# Patient Record
Sex: MALE | Race: WHITE | NOT HISPANIC OR LATINO | Employment: OTHER | URBAN - METROPOLITAN AREA
[De-identification: names, ages, dates, MRNs, and addresses within clinical notes are randomized per-mention and may not be internally consistent; named-entity substitution may affect disease eponyms.]

---

## 2017-01-11 ENCOUNTER — HOSPITAL ENCOUNTER (EMERGENCY)
Facility: MEDICAL CENTER | Age: 37
End: 2017-01-11
Payer: COMMERCIAL

## 2017-01-11 PROCEDURE — 302449 STATCHG TRIAGE ONLY (STATISTIC)

## 2017-01-12 NOTE — ED NOTES
After undressing patient he stated that he felt a though the staff offended him. Staff asked patient what drugs if any that he consumed tonight. Patient was figgidty and unable to articulate his words. Patient is steady on his feet, able to dress himself and ambulate without incident. Patient had his knife returned to him by security.

## 2019-01-04 ENCOUNTER — APPOINTMENT (OUTPATIENT)
Dept: RADIOLOGY | Facility: MEDICAL CENTER | Age: 39
End: 2019-01-04
Attending: EMERGENCY MEDICINE
Payer: COMMERCIAL

## 2019-01-04 ENCOUNTER — HOSPITAL ENCOUNTER (EMERGENCY)
Facility: MEDICAL CENTER | Age: 39
End: 2019-01-04
Attending: EMERGENCY MEDICINE
Payer: COMMERCIAL

## 2019-01-04 VITALS
DIASTOLIC BLOOD PRESSURE: 77 MMHG | WEIGHT: 163.58 LBS | BODY MASS INDEX: 23.42 KG/M2 | TEMPERATURE: 98.1 F | OXYGEN SATURATION: 95 % | HEART RATE: 74 BPM | RESPIRATION RATE: 16 BRPM | SYSTOLIC BLOOD PRESSURE: 117 MMHG | HEIGHT: 70 IN

## 2019-01-04 DIAGNOSIS — S20.212A CONTUSION OF LEFT CHEST WALL, INITIAL ENCOUNTER: ICD-10-CM

## 2019-01-04 DIAGNOSIS — R07.89 CHEST WALL PAIN: ICD-10-CM

## 2019-01-04 LAB
ALBUMIN SERPL BCP-MCNC: 4.6 G/DL (ref 3.2–4.9)
ALBUMIN/GLOB SERPL: 2 G/DL
ALP SERPL-CCNC: 62 U/L (ref 30–99)
ALT SERPL-CCNC: 13 U/L (ref 2–50)
ANION GAP SERPL CALC-SCNC: 9 MMOL/L (ref 0–11.9)
APTT PPP: 26.8 SEC (ref 24.7–36)
AST SERPL-CCNC: 13 U/L (ref 12–45)
BASOPHILS # BLD AUTO: 0.9 % (ref 0–1.8)
BASOPHILS # BLD: 0.07 K/UL (ref 0–0.12)
BILIRUB SERPL-MCNC: 1.9 MG/DL (ref 0.1–1.5)
BUN SERPL-MCNC: 24 MG/DL (ref 8–22)
CALCIUM SERPL-MCNC: 9.7 MG/DL (ref 8.5–10.5)
CHLORIDE SERPL-SCNC: 104 MMOL/L (ref 96–112)
CO2 SERPL-SCNC: 25 MMOL/L (ref 20–33)
CREAT SERPL-MCNC: 1.09 MG/DL (ref 0.5–1.4)
D DIMER PPP IA.FEU-MCNC: <0.4 UG/ML (FEU) (ref 0–0.5)
EOSINOPHIL # BLD AUTO: 0.09 K/UL (ref 0–0.51)
EOSINOPHIL NFR BLD: 1.1 % (ref 0–6.9)
ERYTHROCYTE [DISTWIDTH] IN BLOOD BY AUTOMATED COUNT: 43.4 FL (ref 35.9–50)
GLOBULIN SER CALC-MCNC: 2.3 G/DL (ref 1.9–3.5)
GLUCOSE SERPL-MCNC: 110 MG/DL (ref 65–99)
HCT VFR BLD AUTO: 47.3 % (ref 42–52)
HGB BLD-MCNC: 16.5 G/DL (ref 14–18)
IMM GRANULOCYTES # BLD AUTO: 0.02 K/UL (ref 0–0.11)
IMM GRANULOCYTES NFR BLD AUTO: 0.3 % (ref 0–0.9)
INR PPP: 1.1 (ref 0.87–1.13)
LIPASE SERPL-CCNC: 23 U/L (ref 11–82)
LYMPHOCYTES # BLD AUTO: 2.24 K/UL (ref 1–4.8)
LYMPHOCYTES NFR BLD: 28 % (ref 22–41)
MCH RBC QN AUTO: 32.4 PG (ref 27–33)
MCHC RBC AUTO-ENTMCNC: 34.9 G/DL (ref 33.7–35.3)
MCV RBC AUTO: 92.7 FL (ref 81.4–97.8)
MONOCYTES # BLD AUTO: 1.02 K/UL (ref 0–0.85)
MONOCYTES NFR BLD AUTO: 12.8 % (ref 0–13.4)
NEUTROPHILS # BLD AUTO: 4.56 K/UL (ref 1.82–7.42)
NEUTROPHILS NFR BLD: 56.9 % (ref 44–72)
NRBC # BLD AUTO: 0 K/UL
NRBC BLD-RTO: 0 /100 WBC
PLATELET # BLD AUTO: 248 K/UL (ref 164–446)
PMV BLD AUTO: 10 FL (ref 9–12.9)
POTASSIUM SERPL-SCNC: 3.7 MMOL/L (ref 3.6–5.5)
PROT SERPL-MCNC: 6.9 G/DL (ref 6–8.2)
PROTHROMBIN TIME: 14.3 SEC (ref 12–14.6)
RBC # BLD AUTO: 5.1 M/UL (ref 4.7–6.1)
SODIUM SERPL-SCNC: 138 MMOL/L (ref 135–145)
TROPONIN I SERPL-MCNC: 0.01 NG/ML (ref 0–0.04)
WBC # BLD AUTO: 8 K/UL (ref 4.8–10.8)

## 2019-01-04 PROCEDURE — 99285 EMERGENCY DEPT VISIT HI MDM: CPT

## 2019-01-04 PROCEDURE — 700111 HCHG RX REV CODE 636 W/ 250 OVERRIDE (IP): Performed by: EMERGENCY MEDICINE

## 2019-01-04 PROCEDURE — 96374 THER/PROPH/DIAG INJ IV PUSH: CPT

## 2019-01-04 PROCEDURE — 700102 HCHG RX REV CODE 250 W/ 637 OVERRIDE(OP): Performed by: EMERGENCY MEDICINE

## 2019-01-04 PROCEDURE — 85730 THROMBOPLASTIN TIME PARTIAL: CPT

## 2019-01-04 PROCEDURE — 700117 HCHG RX CONTRAST REV CODE 255: Performed by: EMERGENCY MEDICINE

## 2019-01-04 PROCEDURE — 85379 FIBRIN DEGRADATION QUANT: CPT

## 2019-01-04 PROCEDURE — 84484 ASSAY OF TROPONIN QUANT: CPT

## 2019-01-04 PROCEDURE — 83690 ASSAY OF LIPASE: CPT

## 2019-01-04 PROCEDURE — 80053 COMPREHEN METABOLIC PANEL: CPT

## 2019-01-04 PROCEDURE — 36415 COLL VENOUS BLD VENIPUNCTURE: CPT

## 2019-01-04 PROCEDURE — 71260 CT THORAX DX C+: CPT

## 2019-01-04 PROCEDURE — 85610 PROTHROMBIN TIME: CPT

## 2019-01-04 PROCEDURE — 96375 TX/PRO/DX INJ NEW DRUG ADDON: CPT

## 2019-01-04 PROCEDURE — 85025 COMPLETE CBC W/AUTO DIFF WBC: CPT

## 2019-01-04 PROCEDURE — A9270 NON-COVERED ITEM OR SERVICE: HCPCS | Performed by: EMERGENCY MEDICINE

## 2019-01-04 PROCEDURE — 93005 ELECTROCARDIOGRAM TRACING: CPT | Performed by: EMERGENCY MEDICINE

## 2019-01-04 RX ORDER — CELECOXIB 100 MG/1
100 CAPSULE ORAL 2 TIMES DAILY
Qty: 20 CAP | Refills: 0 | Status: SHIPPED | OUTPATIENT
Start: 2019-01-04 | End: 2023-10-02

## 2019-01-04 RX ORDER — MORPHINE SULFATE 4 MG/ML
4 INJECTION, SOLUTION INTRAMUSCULAR; INTRAVENOUS ONCE
Status: COMPLETED | OUTPATIENT
Start: 2019-01-04 | End: 2019-01-04

## 2019-01-04 RX ORDER — OXYCODONE HYDROCHLORIDE AND ACETAMINOPHEN 5; 325 MG/1; MG/1
1 TABLET ORAL ONCE
Status: COMPLETED | OUTPATIENT
Start: 2019-01-04 | End: 2019-01-04

## 2019-01-04 RX ORDER — ONDANSETRON 4 MG/1
4 TABLET, ORALLY DISINTEGRATING ORAL ONCE
Status: COMPLETED | OUTPATIENT
Start: 2019-01-04 | End: 2019-01-04

## 2019-01-04 RX ORDER — ONDANSETRON 2 MG/ML
4 INJECTION INTRAMUSCULAR; INTRAVENOUS ONCE
Status: COMPLETED | OUTPATIENT
Start: 2019-01-04 | End: 2019-01-04

## 2019-01-04 RX ADMIN — OXYCODONE AND ACETAMINOPHEN 1 TABLET: 5; 325 TABLET ORAL at 17:01

## 2019-01-04 RX ADMIN — ONDANSETRON 4 MG: 2 INJECTION INTRAMUSCULAR; INTRAVENOUS at 14:51

## 2019-01-04 RX ADMIN — ONDANSETRON 4 MG: 4 TABLET, ORALLY DISINTEGRATING ORAL at 17:01

## 2019-01-04 RX ADMIN — IOHEXOL 100 ML: 350 INJECTION, SOLUTION INTRAVENOUS at 15:53

## 2019-01-04 RX ADMIN — MORPHINE SULFATE 4 MG: 4 INJECTION INTRAVENOUS at 14:52

## 2019-01-04 ASSESSMENT — PAIN SCALES - GENERAL
PAINLEVEL_OUTOF10: 9
PAINLEVEL_OUTOF10: 3

## 2019-01-04 NOTE — ED PROVIDER NOTES
"ED Provider Note  CHIEF COMPLAINT  Chief Complaint   Patient presents with   • Rib Pain     pt reports to have slipped while pushing vehicle and striking ribs on vehicles mirror. pt states increased SOB since. pain in left shoulder that comes and goes in intensity.    • Shortness of Breath       HPI  Ady Bowser is a 38 y.o. male who presents complaining of pain to his left lower posterior and anterior ribs.  Feels little short of breath.  He was pushing his car on [place] approximately 11 or 12 days ago.  And injured his left ribs.  He feels he has increasing pain and increasing difficulty breathing.  He is been eating well taking in fluids well.  May be a minimal amount of left upper quadrant abdominal pain.    REVIEW OF SYSTEMS  No headache, no neck pain, no pelvic pain or extremity injury.  ALL OTHER SYSTEMS NEGATIVE    ALLERGIES  No known drug allergies  PAST MEDICAL HISTORY  Past Medical History:   Diagnosis Date   • Asthma    • Bipolar 1 disorder (HCC)    • H/O suicide attempt    • Renal failure     secondary to flexeril overdose years ago     FAMILY HISTORY  Negative    SOCIAL HISTORY  Cigarette smoker    PHYSICAL EXAM  GENERAL: *Alert male adult  VITAL SIGNS: /79   Pulse 83   Temp 36.4 °C (97.6 °F) (Temporal)   Resp 16   Ht 1.778 m (5' 10\")   Wt 74.2 kg (163 lb 9.3 oz)   SpO2 97%   BMI 23.47 kg/m²    Constitutional: Alert male adult HENT: Scalp is normal size and nontender. Ears are clear. Nose is clear. Throat is clear with no stridor no drooling no trismus. Teeth are all intact.  Eyes: Pupils equal round and reactive to light, extraocular motor fall. There is no scleral icterus.  Neck: Neck is supple and nontender. There is no meningismus. No adenitis. No thyromegaly.  Cardiovascular: Heart regular rhythm without murmurs or gallops   Thorax & Lungs: Left anterior lower and left posterior lower chest wall tenderness. Lungs are clear. Patient has good breath sounds bilateral. No " rales, no rhonchi, no wheezes.  Abdomen: Abdomen is soft, minimal left upper quadrant tenderness not rigid, no guarding, and no organomegaly. There is no palpable hernia   Skin: Warm, pink, and dry with no erythema and no rash.   Back: Nontender, no midline bony tenderness, no flank tenderness.  Extremities: Full range of motion  No tenderness to palpation and no deformities noted. No calf or thigh swelling. No calf or thigh tenderness. No clinical DVT.  Neurologic: Alert & oriented . Cranial nerves are grossly intact as tested. Patient moves all 4 extremities well. Patient has good strong flexion and extension of the ankle joints knee joints hip joints and elbow joints. Sensation is normal and symmetrical in the upper and lower extremities.   Psychiatric: Patient is alert oriented coherent and rational.     RADIOLOGY/PROCEDURES  CT-CHEST,ABDOMEN,PELVIS WITH   Final Result      No acute or significant abnormality within the chest, abdomen, or pelvis        COURSE & MEDICAL DECISION MAKING  11 days post injury to his left lower rib area.  Has a minimal amount of left upper quadrant abdominal pain and difficulty breathing.  No fever chills or cough.  Differential diagnosis: Rib fracture, pneumothorax, hemothorax, spleen injury, pneumonia, etc.    Plan: #1 IV #2 CT of the chest abdomen and pelvis #3 lab evaluation including CBC, CMP, pro time, urinalysis.  4.  Intravenous morphine and Zofran.    EKG interpreted by me shows what appears to be J-point elevation in lead III and aVF.  T wave inversion in aVL.  However the patient is a cigarette smoker with a positive family history.  I have recommended that he be admitted for stress test.    Laboratory and reexamination course: CBC is normal no anemia no bandemia.  Hemoglobin is 16.  Chemistry panel is normal with no renal nor liver dysfunction.  Lipase is normal no pancreatitis.  CT chest abdomen and pelvis shows no intra-abdominal findings no pneumonia.  No pneumothorax  no hemothorax.  Patient stable for discharge.    Results for orders placed or performed during the hospital encounter of 01/04/19   CBC WITH DIFFERENTIAL   Result Value Ref Range    WBC 8.0 4.8 - 10.8 K/uL    RBC 5.10 4.70 - 6.10 M/uL    Hemoglobin 16.5 14.0 - 18.0 g/dL    Hematocrit 47.3 42.0 - 52.0 %    MCV 92.7 81.4 - 97.8 fL    MCH 32.4 27.0 - 33.0 pg    MCHC 34.9 33.7 - 35.3 g/dL    RDW 43.4 35.9 - 50.0 fL    Platelet Count 248 164 - 446 K/uL    MPV 10.0 9.0 - 12.9 fL    Neutrophils-Polys 56.90 44.00 - 72.00 %    Lymphocytes 28.00 22.00 - 41.00 %    Monocytes 12.80 0.00 - 13.40 %    Eosinophils 1.10 0.00 - 6.90 %    Basophils 0.90 0.00 - 1.80 %    Immature Granulocytes 0.30 0.00 - 0.90 %    Nucleated RBC 0.00 /100 WBC    Neutrophils (Absolute) 4.56 1.82 - 7.42 K/uL    Lymphs (Absolute) 2.24 1.00 - 4.80 K/uL    Monos (Absolute) 1.02 (H) 0.00 - 0.85 K/uL    Eos (Absolute) 0.09 0.00 - 0.51 K/uL    Baso (Absolute) 0.07 0.00 - 0.12 K/uL    Immature Granulocytes (abs) 0.02 0.00 - 0.11 K/uL    NRBC (Absolute) 0.00 K/uL   COMP METABOLIC PANEL   Result Value Ref Range    Sodium 138 135 - 145 mmol/L    Potassium 3.7 3.6 - 5.5 mmol/L    Chloride 104 96 - 112 mmol/L    Co2 25 20 - 33 mmol/L    Anion Gap 9.0 0.0 - 11.9    Glucose 110 (H) 65 - 99 mg/dL    Bun 24 (H) 8 - 22 mg/dL    Creatinine 1.09 0.50 - 1.40 mg/dL    Calcium 9.7 8.5 - 10.5 mg/dL    AST(SGOT) 13 12 - 45 U/L    ALT(SGPT) 13 2 - 50 U/L    Alkaline Phosphatase 62 30 - 99 U/L    Total Bilirubin 1.9 (H) 0.1 - 1.5 mg/dL    Albumin 4.6 3.2 - 4.9 g/dL    Total Protein 6.9 6.0 - 8.2 g/dL    Globulin 2.3 1.9 - 3.5 g/dL    A-G Ratio 2.0 g/dL   LIPASE   Result Value Ref Range    Lipase 23 11 - 82 U/L   PROTHROMBIN TIME   Result Value Ref Range    PT 14.3 12.0 - 14.6 sec    INR 1.10 0.87 - 1.13   APTT   Result Value Ref Range    APTT 26.8 24.7 - 36.0 sec   ESTIMATED GFR   Result Value Ref Range    GFR If African American >60 >60 mL/min/1.73 m 2    GFR If Non African  American >60 >60 mL/min/1.73 m 2      Patient has a left chest wall contusion.  His evaluation here is normal.  No pneumothorax no hemothorax no spleen injury etc. she clearly has a left chest wall contusion.  Troponin and a d-dimer and an EKG will be obtained also.  Mother had a heart attack in her 30s.  No arrhythmias here.  Admission for a stress test and cardiac evaluation recommended.    At 5 PM the patient along with his female significant other have decided that he should sign out AGAINST MEDICAL ADVICE.  He understands the risks associated with leaving against advice.  The risks of being heart attack pulmonary embolism brain damage death respiratory arrest etc.  He understands those risks..  He will go to the emergency room in [place] where he lives.    Home treatment: #1 he has  been given a copy of all of his reports #2 is been told to go directly to the [facility] where he lives for reevaluation.  #3 has been told to return to the ED here anytime as needed.    .  FINAL IMPRESSION  1 left chest wall injury contusion.  2.  Cigarette smoker with family history for heart disease  3.  The patient is signed out AGAINST MEDICAL ADVICE.  Electronically signed by: Gary Gansert, 1/4/2019/4:30 PM

## 2019-01-04 NOTE — ED TRIAGE NOTES
Chief Complaint   Patient presents with   • Rib Pain     pt reports to have slipped while pushing vehicle and striking ribs on vehicles mirror. pt states increased SOB since. pain in left shoulder that comes and goes in intensity.    • Shortness of Breath       Explained to pt triage process, made pt aware to tell this RN/staff of any changes/concerns, pt verbalized understanding of process and instructions given. Pt to ER lobby.

## 2019-01-05 NOTE — ED NOTES
Pt left AMA, instruction and prescription given. Pt will f/u with pcp for further eval outpatient as discussed by Dr.GAnsert . Wife will be driving home, vss ambulating steady

## 2019-01-05 NOTE — ED NOTES
Pt pulled his iv out , checked tip of catheter , intact.Bleeding stopped and bruising noted.Pt wants to leave now, informed erp.   Dr.GAnsert at bedside

## 2019-01-20 LAB — EKG IMPRESSION: NORMAL

## 2023-10-02 ENCOUNTER — APPOINTMENT (OUTPATIENT)
Dept: RADIOLOGY | Facility: MEDICAL CENTER | Age: 43
End: 2023-10-02
Attending: EMERGENCY MEDICINE
Payer: COMMERCIAL

## 2023-10-02 ENCOUNTER — HOSPITAL ENCOUNTER (EMERGENCY)
Facility: MEDICAL CENTER | Age: 43
End: 2023-10-02
Attending: EMERGENCY MEDICINE
Payer: COMMERCIAL

## 2023-10-02 VITALS
BODY MASS INDEX: 26.05 KG/M2 | WEIGHT: 182 LBS | HEIGHT: 70 IN | SYSTOLIC BLOOD PRESSURE: 130 MMHG | DIASTOLIC BLOOD PRESSURE: 83 MMHG | HEART RATE: 94 BPM | TEMPERATURE: 97.7 F | RESPIRATION RATE: 22 BRPM | OXYGEN SATURATION: 93 %

## 2023-10-02 DIAGNOSIS — S16.1XXA STRAIN OF NECK MUSCLE, INITIAL ENCOUNTER: ICD-10-CM

## 2023-10-02 DIAGNOSIS — S09.90XA CLOSED HEAD INJURY, INITIAL ENCOUNTER: ICD-10-CM

## 2023-10-02 DIAGNOSIS — S40.011A CONTUSION OF RIGHT SHOULDER, INITIAL ENCOUNTER: ICD-10-CM

## 2023-10-02 LAB
ALBUMIN SERPL BCP-MCNC: 4.8 G/DL (ref 3.2–4.9)
ALBUMIN/GLOB SERPL: 1.8 G/DL
ALP SERPL-CCNC: 93 U/L (ref 30–99)
ALT SERPL-CCNC: 21 U/L (ref 2–50)
ANION GAP SERPL CALC-SCNC: 14 MMOL/L (ref 7–16)
APTT PPP: 25.3 SEC (ref 24.7–36)
AST SERPL-CCNC: 15 U/L (ref 12–45)
BASOPHILS # BLD AUTO: 0.7 % (ref 0–1.8)
BASOPHILS # BLD: 0.06 K/UL (ref 0–0.12)
BILIRUB SERPL-MCNC: 0.6 MG/DL (ref 0.1–1.5)
BUN SERPL-MCNC: 16 MG/DL (ref 8–22)
CALCIUM ALBUM COR SERPL-MCNC: 9.1 MG/DL (ref 8.5–10.5)
CALCIUM SERPL-MCNC: 9.7 MG/DL (ref 8.5–10.5)
CHLORIDE SERPL-SCNC: 106 MMOL/L (ref 96–112)
CO2 SERPL-SCNC: 19 MMOL/L (ref 20–33)
CREAT SERPL-MCNC: 0.67 MG/DL (ref 0.5–1.4)
EOSINOPHIL # BLD AUTO: 0.07 K/UL (ref 0–0.51)
EOSINOPHIL NFR BLD: 0.9 % (ref 0–6.9)
ERYTHROCYTE [DISTWIDTH] IN BLOOD BY AUTOMATED COUNT: 41.4 FL (ref 35.9–50)
GFR SERPLBLD CREATININE-BSD FMLA CKD-EPI: 119 ML/MIN/1.73 M 2
GLOBULIN SER CALC-MCNC: 2.7 G/DL (ref 1.9–3.5)
GLUCOSE SERPL-MCNC: 102 MG/DL (ref 65–99)
HCT VFR BLD AUTO: 46.9 % (ref 42–52)
HGB BLD-MCNC: 16.1 G/DL (ref 14–18)
IMM GRANULOCYTES # BLD AUTO: 0.03 K/UL (ref 0–0.11)
IMM GRANULOCYTES NFR BLD AUTO: 0.4 % (ref 0–0.9)
INR PPP: 1.08 (ref 0.87–1.13)
LYMPHOCYTES # BLD AUTO: 1.91 K/UL (ref 1–4.8)
LYMPHOCYTES NFR BLD: 23.7 % (ref 22–41)
MCH RBC QN AUTO: 30.3 PG (ref 27–33)
MCHC RBC AUTO-ENTMCNC: 34.3 G/DL (ref 32.3–36.5)
MCV RBC AUTO: 88.2 FL (ref 81.4–97.8)
MONOCYTES # BLD AUTO: 0.74 K/UL (ref 0–0.85)
MONOCYTES NFR BLD AUTO: 9.2 % (ref 0–13.4)
NEUTROPHILS # BLD AUTO: 5.25 K/UL (ref 1.82–7.42)
NEUTROPHILS NFR BLD: 65.1 % (ref 44–72)
NRBC # BLD AUTO: 0 K/UL
NRBC BLD-RTO: 0 /100 WBC (ref 0–0.2)
PLATELET # BLD AUTO: 292 K/UL (ref 164–446)
PMV BLD AUTO: 10 FL (ref 9–12.9)
POTASSIUM SERPL-SCNC: 4.4 MMOL/L (ref 3.6–5.5)
PROT SERPL-MCNC: 7.5 G/DL (ref 6–8.2)
PROTHROMBIN TIME: 14.1 SEC (ref 12–14.6)
RBC # BLD AUTO: 5.32 M/UL (ref 4.7–6.1)
SODIUM SERPL-SCNC: 139 MMOL/L (ref 135–145)
WBC # BLD AUTO: 8.1 K/UL (ref 4.8–10.8)

## 2023-10-02 PROCEDURE — 85610 PROTHROMBIN TIME: CPT

## 2023-10-02 PROCEDURE — 71045 X-RAY EXAM CHEST 1 VIEW: CPT

## 2023-10-02 PROCEDURE — 70450 CT HEAD/BRAIN W/O DYE: CPT

## 2023-10-02 PROCEDURE — 80053 COMPREHEN METABOLIC PANEL: CPT

## 2023-10-02 PROCEDURE — 99284 EMERGENCY DEPT VISIT MOD MDM: CPT

## 2023-10-02 PROCEDURE — 700102 HCHG RX REV CODE 250 W/ 637 OVERRIDE(OP): Performed by: EMERGENCY MEDICINE

## 2023-10-02 PROCEDURE — 73030 X-RAY EXAM OF SHOULDER: CPT | Mod: RT

## 2023-10-02 PROCEDURE — 85730 THROMBOPLASTIN TIME PARTIAL: CPT

## 2023-10-02 PROCEDURE — 73000 X-RAY EXAM OF COLLAR BONE: CPT | Mod: RT

## 2023-10-02 PROCEDURE — 36415 COLL VENOUS BLD VENIPUNCTURE: CPT

## 2023-10-02 PROCEDURE — 72125 CT NECK SPINE W/O DYE: CPT

## 2023-10-02 PROCEDURE — A9270 NON-COVERED ITEM OR SERVICE: HCPCS | Performed by: EMERGENCY MEDICINE

## 2023-10-02 PROCEDURE — 85025 COMPLETE CBC W/AUTO DIFF WBC: CPT

## 2023-10-02 RX ORDER — HYDROCODONE BITARTRATE AND ACETAMINOPHEN 5; 325 MG/1; MG/1
2 TABLET ORAL ONCE
Status: COMPLETED | OUTPATIENT
Start: 2023-10-02 | End: 2023-10-02

## 2023-10-02 RX ADMIN — HYDROCODONE BITARTRATE AND ACETAMINOPHEN 2 TABLET: 5; 325 TABLET ORAL at 15:18

## 2023-10-02 ASSESSMENT — LIFESTYLE VARIABLES: DO YOU DRINK ALCOHOL: NO

## 2023-10-02 NOTE — ED PROVIDER NOTES
"ED Provider Note    CHIEF COMPLAINT  Chief Complaint   Patient presents with    T-5000    Fall Greater than 10 feet    Shoulder Pain    Neck Pain    Numbness       EXTERNAL RECORDS REVIEWED  Reviewed evaluation at [facility] urgent care [facility]    HPI/ROS  LIMITATION TO HISTORY   None  OUTSIDE HISTORIAN(S):  None    Ady Bowser is a 43 y.o. male who presents for evaluation of trauma.  The patient [place] 4 [place].  He injured his head and neck and right shoulder.  He has had some transient paresthesias to the right aspect of his face.  Patient is not on any anticoagulants.  He apparently was laying on the couch all weekend and came in to an urgent care in [facility] today for evaluation.  They felt his mechanism merited higher level of care and referred him here to our local trauma center.  He denies any focal numbness weakness tingling to the arms legs or face currently.  He reports no pain or trauma to the chest abdomen pelvis.  He reports headache, neck pain and right shoulder and clavicle pain.  No other complaints    PAST MEDICAL HISTORY   has a past medical history of Asthma, Bipolar 1 disorder (HCC), H/O suicide attempt, and Renal failure.    SURGICAL HISTORY   has a past surgical history that includes appendectomy and hernia repair.    FAMILY HISTORY  History reviewed. No pertinent family history.    SOCIAL HISTORY  Social History     Tobacco Use    Smoking status: Former     Current packs/day: 0.50     Types: Cigarettes    Smokeless tobacco: Current   Vaping Use    Vaping Use: Not on file   Substance and Sexual Activity    Alcohol use: Not Currently     Comment: socially    Drug use: Not Currently     Comment: denies    Sexual activity: Not on file       CURRENT MEDICATIONS  Home Medications       Reviewed by Mini Goodwin R.N. (Registered Nurse) on 10/02/23 at 1439  Med List Status: Complete     Medication Last " "Dose Status   albuterol (VENTOLIN OR PROVENTIL) 108 (90 BASE) MCG/ACT Aero Soln  Active                    ALLERGIES  Allergies   Allergen Reactions    Bee     Toradol [Ketorolac Tromethamine]      Headaches, redness in arm of injection       PHYSICAL EXAM  VITAL SIGNS: /83   Pulse 94   Temp 36.7 °C (98 °F) (Temporal)   Resp (!) 22   Ht 1.778 m (5' 10\")   Wt 82.6 kg (182 lb)   SpO2 93%   BMI 26.11 kg/m²    Pulse ox interpretation: I interpret this pulse ox as normal.  Constitutional: Alert and oriented x 3, no acute distress  HEENT: Atraumatic normocephalic, pupils are equal round reactive to light extraocular movements are intact. The nares is clear, external ears are normal, mouth shows moist mucous membranes normal dentition for age  Neck: Rigid cervical collar is in place bony tenderness at C5-6 without step-off   cardiovascular: Regular rate and rhythm no murmur rub or gallop 2+ pulses peripherally x4  Thorax & Lungs: No respiratory distress, no wheezes rales or rhonchi, No chest tenderness.   GI: Soft nontender nondistended positive bowel sounds, no peritoneal signs  Skin: Warm dry no acute rash or lesion  Musculoskeletal: Tenderness noted at the acromioclavicular joint on the right side without step-off.  Tenderness over the distal third of the right clavicle without tenting of the skin.    Neurologic: Cranial nerves III through XII are grossly intact no sensory deficit no cerebellar dysfunction   Psychiatric: Appropriate affect for situation at this time          DIAGNOSTIC STUDIES / PROCEDURES    LABS  Results for orders placed or performed during the hospital encounter of 10/02/23   CBC WITH DIFFERENTIAL   Result Value Ref Range    WBC 8.1 4.8 - 10.8 K/uL    RBC 5.32 4.70 - 6.10 M/uL    Hemoglobin 16.1 14.0 - 18.0 g/dL    Hematocrit 46.9 42.0 - 52.0 %    MCV 88.2 81.4 - 97.8 fL    MCH 30.3 27.0 - 33.0 pg    MCHC 34.3 32.3 - 36.5 g/dL    RDW 41.4 35.9 - 50.0 fL    Platelet Count 292 164 - 446 " K/uL    MPV 10.0 9.0 - 12.9 fL    Neutrophils-Polys 65.10 44.00 - 72.00 %    Lymphocytes 23.70 22.00 - 41.00 %    Monocytes 9.20 0.00 - 13.40 %    Eosinophils 0.90 0.00 - 6.90 %    Basophils 0.70 0.00 - 1.80 %    Immature Granulocytes 0.40 0.00 - 0.90 %    Nucleated RBC 0.00 0.00 - 0.20 /100 WBC    Neutrophils (Absolute) 5.25 1.82 - 7.42 K/uL    Lymphs (Absolute) 1.91 1.00 - 4.80 K/uL    Monos (Absolute) 0.74 0.00 - 0.85 K/uL    Eos (Absolute) 0.07 0.00 - 0.51 K/uL    Baso (Absolute) 0.06 0.00 - 0.12 K/uL    Immature Granulocytes (abs) 0.03 0.00 - 0.11 K/uL    NRBC (Absolute) 0.00 K/uL   Comp Metabolic Panel   Result Value Ref Range    Sodium 139 135 - 145 mmol/L    Potassium 4.4 3.6 - 5.5 mmol/L    Chloride 106 96 - 112 mmol/L    Co2 19 (L) 20 - 33 mmol/L    Anion Gap 14.0 7.0 - 16.0    Glucose 102 (H) 65 - 99 mg/dL    Bun 16 8 - 22 mg/dL    Creatinine 0.67 0.50 - 1.40 mg/dL    Calcium 9.7 8.5 - 10.5 mg/dL    Correct Calcium 9.1 8.5 - 10.5 mg/dL    AST(SGOT) 15 12 - 45 U/L    ALT(SGPT) 21 2 - 50 U/L    Alkaline Phosphatase 93 30 - 99 U/L    Total Bilirubin 0.6 0.1 - 1.5 mg/dL    Albumin 4.8 3.2 - 4.9 g/dL    Total Protein 7.5 6.0 - 8.2 g/dL    Globulin 2.7 1.9 - 3.5 g/dL    A-G Ratio 1.8 g/dL   Prothrombin Time   Result Value Ref Range    PT 14.1 12.0 - 14.6 sec    INR 1.08 0.87 - 1.13   APTT   Result Value Ref Range    APTT 25.3 24.7 - 36.0 sec   ESTIMATED GFR   Result Value Ref Range    GFR (CKD-EPI) 119 >60 mL/min/1.73 m 2        RADIOLOGY  I have independently interpreted the diagnostic imaging associated with this visit and am waiting the final reading from the radiologist.   My preliminary interpretation is as follows: No acute skull fracture or intracranial hemorrhage no obvious rib fractures possible remote old clavicle fracture  Radiologist interpretation:   CT-CSPINE WITHOUT PLUS RECONS   Final Result      1.  No acute traumatic injury of the cervical spine.   2.  Disc degenerative changes at C5-C6 and  C6-7.   3.  Facet joint degenerative changes.      CT-HEAD W/O   Final Result      Head CT without contrast within normal limits. No evidence of acute cerebral infarction, hemorrhage or mass lesion.         DX-SHOULDER 2+ RIGHT   Final Result      1.  Normal shoulder series.      DX-CLAVICLE RIGHT   Final Result      Remote appearing mid right clavicular fracture with possible nondisplaced acute on chronic fracture. Correlate with point tenderness for acute injury.      DX-CHEST-PORTABLE (1 VIEW)   Final Result      1.  No acute cardiac or pulmonary abnormalities are identified.          COURSE & MEDICAL DECISION MAKING    ED Observation Status? No; Patient does not meet criteria for ED Observation.     INITIAL ASSESSMENT, COURSE AND PLAN  Care Narrative:   This is a very pleasant 43-year-old gentleman who had major mechanism trauma 4 days ago.  He was seen at a local urgent care up in [facility] referred here for higher level care.  His vital signs are reassuring and normal.  Specifically no hypotension no hypoxia fever or tachycardia.  Chest x-ray and right shoulder x-ray are unremarkable.  He does report a history of a old clavicle fracture that is not likely what appears to be an acute new clavicle fracture.  Subsequent CT scan of the head and cervical spine do not demonstrate any acute process.  He was given some oral Norco.  Laboratory studies including CBC and metabolic panel are reassuring and normal      ADDITIONAL PROBLEM LIST    DISPOSITION AND DISCUSSIONS  I have discussed management of the patient with the following physicians and BERENICE's: None    Discussion of management with other QHP or appropriate source(s): None    Escalation of care considered, and ultimately not performed:IV fluids    Barriers to care at this time, including but not limited to: Patient does not have established PCP.     Decision tools and prescription drugs considered including, but not limited to: Considered opioids but  recommended NSAIDs    FINAL DIAGNOSIS  1. Strain of neck muscle, initial encounter        2. Closed head injury, initial encounter        3. Contusion of right shoulder, initial encounter                 Electronically signed by: Dameon Do M.D., 10/2/2023 3:36 PM

## 2023-10-02 NOTE — ED TRIAGE NOTES
Patient arrives by EMS from home for a fall down an embankment 4 days ago. He reprots he slipped and fell down a hill. +loc unknown amount of time and then was able to walk out form the area. Has right shoulder pain, right side neck pain, blurred vision. Right arm, right leg and intermittent right face tingling. Arrives in a c-collar and sling.

## 2023-10-03 NOTE — ED NOTES
Pt has been provided written and verbal education on rotator cup injury and head injury. Home care and when to return to the ED discussed.

## 2023-10-03 NOTE — DISCHARGE PLANNING
KATI contacted to assist with transportation; KATI scheduled uber ride, approved via leader on call.  KATI met with patient to confirm address and informed him of uber ride being programmed.  Pt left via uber.

## 2025-04-10 ENCOUNTER — APPOINTMENT (OUTPATIENT)
Dept: RADIOLOGY | Facility: MEDICAL CENTER | Age: 45
End: 2025-04-10
Payer: COMMERCIAL

## 2025-04-10 ENCOUNTER — HOSPITAL ENCOUNTER (EMERGENCY)
Facility: MEDICAL CENTER | Age: 45
End: 2025-04-10
Payer: COMMERCIAL

## 2025-04-10 VITALS
TEMPERATURE: 98.1 F | WEIGHT: 173.5 LBS | SYSTOLIC BLOOD PRESSURE: 124 MMHG | BODY MASS INDEX: 24.89 KG/M2 | HEART RATE: 74 BPM | RESPIRATION RATE: 18 BRPM | DIASTOLIC BLOOD PRESSURE: 73 MMHG | OXYGEN SATURATION: 96 %

## 2025-04-10 LAB
ALBUMIN SERPL BCP-MCNC: 3.8 G/DL (ref 3.2–4.9)
ALBUMIN/GLOB SERPL: 1.6 G/DL
ALP SERPL-CCNC: 77 U/L (ref 30–99)
ALT SERPL-CCNC: 14 U/L (ref 2–50)
ANION GAP SERPL CALC-SCNC: 10 MMOL/L (ref 7–16)
AST SERPL-CCNC: 17 U/L (ref 12–45)
BASOPHILS # BLD AUTO: 0.7 % (ref 0–1.8)
BASOPHILS # BLD: 0.06 K/UL (ref 0–0.12)
BILIRUB SERPL-MCNC: 0.6 MG/DL (ref 0.1–1.5)
BUN SERPL-MCNC: 8 MG/DL (ref 8–22)
CALCIUM ALBUM COR SERPL-MCNC: 9 MG/DL (ref 8.5–10.5)
CALCIUM SERPL-MCNC: 8.8 MG/DL (ref 8.5–10.5)
CHLORIDE SERPL-SCNC: 104 MMOL/L (ref 96–112)
CO2 SERPL-SCNC: 22 MMOL/L (ref 20–33)
CREAT SERPL-MCNC: 1.26 MG/DL (ref 0.5–1.4)
EKG IMPRESSION: NORMAL
EOSINOPHIL # BLD AUTO: 0.05 K/UL (ref 0–0.51)
EOSINOPHIL NFR BLD: 0.6 % (ref 0–6.9)
ERYTHROCYTE [DISTWIDTH] IN BLOOD BY AUTOMATED COUNT: 46.4 FL (ref 35.9–50)
GFR SERPLBLD CREATININE-BSD FMLA CKD-EPI: 72 ML/MIN/1.73 M 2
GLOBULIN SER CALC-MCNC: 2.4 G/DL (ref 1.9–3.5)
GLUCOSE SERPL-MCNC: 106 MG/DL (ref 65–99)
HCT VFR BLD AUTO: 44.1 % (ref 42–52)
HGB BLD-MCNC: 14.7 G/DL (ref 14–18)
IMM GRANULOCYTES # BLD AUTO: 0.02 K/UL (ref 0–0.11)
IMM GRANULOCYTES NFR BLD AUTO: 0.2 % (ref 0–0.9)
LYMPHOCYTES # BLD AUTO: 2.3 K/UL (ref 1–4.8)
LYMPHOCYTES NFR BLD: 25.7 % (ref 22–41)
MCH RBC QN AUTO: 31.1 PG (ref 27–33)
MCHC RBC AUTO-ENTMCNC: 33.3 G/DL (ref 32.3–36.5)
MCV RBC AUTO: 93.2 FL (ref 81.4–97.8)
MONOCYTES # BLD AUTO: 0.76 K/UL (ref 0–0.85)
MONOCYTES NFR BLD AUTO: 8.5 % (ref 0–13.4)
NEUTROPHILS # BLD AUTO: 5.75 K/UL (ref 1.82–7.42)
NEUTROPHILS NFR BLD: 64.3 % (ref 44–72)
NRBC # BLD AUTO: 0 K/UL
NRBC BLD-RTO: 0 /100 WBC (ref 0–0.2)
NT-PROBNP SERPL IA-MCNC: 172 PG/ML (ref 0–125)
PLATELET # BLD AUTO: 290 K/UL (ref 164–446)
PMV BLD AUTO: 10.2 FL (ref 9–12.9)
POTASSIUM SERPL-SCNC: 3.6 MMOL/L (ref 3.6–5.5)
PROT SERPL-MCNC: 6.2 G/DL (ref 6–8.2)
RBC # BLD AUTO: 4.73 M/UL (ref 4.7–6.1)
SODIUM SERPL-SCNC: 136 MMOL/L (ref 135–145)
TROPONIN T SERPL-MCNC: 8 NG/L (ref 6–19)
WBC # BLD AUTO: 8.9 K/UL (ref 4.8–10.8)

## 2025-04-10 PROCEDURE — 302449 STATCHG TRIAGE ONLY (STATISTIC)

## 2025-04-10 PROCEDURE — 93005 ELECTROCARDIOGRAM TRACING: CPT | Mod: TC

## 2025-04-10 PROCEDURE — 80053 COMPREHEN METABOLIC PANEL: CPT

## 2025-04-10 PROCEDURE — 84484 ASSAY OF TROPONIN QUANT: CPT

## 2025-04-10 PROCEDURE — 85025 COMPLETE CBC W/AUTO DIFF WBC: CPT

## 2025-04-10 PROCEDURE — 93005 ELECTROCARDIOGRAM TRACING: CPT | Mod: TC | Performed by: EMERGENCY MEDICINE

## 2025-04-10 PROCEDURE — 83880 ASSAY OF NATRIURETIC PEPTIDE: CPT

## 2025-04-10 PROCEDURE — 99283 EMERGENCY DEPT VISIT LOW MDM: CPT

## 2025-04-10 ASSESSMENT — FIBROSIS 4 INDEX
FIB4 SCORE: 0.69
FIB4 SCORE: 0.59

## 2025-04-10 ASSESSMENT — PAIN DESCRIPTION - PAIN TYPE: TYPE: ACUTE PAIN

## 2025-04-10 NOTE — ED TRIAGE NOTES
Chief Complaint   Patient presents with    Post-Op Complications     Pt reports endoscopy with biopsy 2 weeks ago, has since been unable to eat due to pain. Pt also reports weight loss and weakness    Chest Pain     /73   Pulse 74   Temp 36.7 °C (98.1 °F) (Temporal)   Resp 18   Wt 78.7 kg (173 lb 8 oz)   SpO2 96%   Pt informed of wait times. Educated on triage process.  Asked to return to triage RN for any new or worsening of symptoms. Thanked for patience.

## 2025-04-11 ENCOUNTER — APPOINTMENT (OUTPATIENT)
Dept: RADIOLOGY | Facility: MEDICAL CENTER | Age: 45
End: 2025-04-11
Attending: EMERGENCY MEDICINE
Payer: COMMERCIAL

## 2025-04-11 ENCOUNTER — HOSPITAL ENCOUNTER (EMERGENCY)
Facility: MEDICAL CENTER | Age: 45
End: 2025-04-11
Attending: EMERGENCY MEDICINE
Payer: COMMERCIAL

## 2025-04-11 VITALS
OXYGEN SATURATION: 93 % | TEMPERATURE: 97.2 F | HEIGHT: 71 IN | HEART RATE: 65 BPM | BODY MASS INDEX: 24.41 KG/M2 | DIASTOLIC BLOOD PRESSURE: 59 MMHG | RESPIRATION RATE: 16 BRPM | SYSTOLIC BLOOD PRESSURE: 128 MMHG | WEIGHT: 174.38 LBS

## 2025-04-11 DIAGNOSIS — R53.1 WEAKNESS: ICD-10-CM

## 2025-04-11 DIAGNOSIS — R07.9 CHEST PAIN, UNSPECIFIED TYPE: ICD-10-CM

## 2025-04-11 LAB
ALBUMIN SERPL BCP-MCNC: 3.7 G/DL (ref 3.2–4.9)
ALBUMIN/GLOB SERPL: 1.8 G/DL
ALP SERPL-CCNC: 69 U/L (ref 30–99)
ALT SERPL-CCNC: 11 U/L (ref 2–50)
ANION GAP SERPL CALC-SCNC: 7 MMOL/L (ref 7–16)
AST SERPL-CCNC: 16 U/L (ref 12–45)
BASOPHILS # BLD AUTO: 1 % (ref 0–1.8)
BASOPHILS # BLD: 0.08 K/UL (ref 0–0.12)
BILIRUB SERPL-MCNC: 0.5 MG/DL (ref 0.1–1.5)
BUN SERPL-MCNC: 7 MG/DL (ref 8–22)
CALCIUM ALBUM COR SERPL-MCNC: 8.8 MG/DL (ref 8.5–10.5)
CALCIUM SERPL-MCNC: 8.6 MG/DL (ref 8.5–10.5)
CHLORIDE SERPL-SCNC: 108 MMOL/L (ref 96–112)
CO2 SERPL-SCNC: 24 MMOL/L (ref 20–33)
CREAT SERPL-MCNC: 0.9 MG/DL (ref 0.5–1.4)
EKG IMPRESSION: NORMAL
EOSINOPHIL # BLD AUTO: 0.1 K/UL (ref 0–0.51)
EOSINOPHIL NFR BLD: 1.3 % (ref 0–6.9)
ERYTHROCYTE [DISTWIDTH] IN BLOOD BY AUTOMATED COUNT: 48.1 FL (ref 35.9–50)
GFR SERPLBLD CREATININE-BSD FMLA CKD-EPI: 108 ML/MIN/1.73 M 2
GLOBULIN SER CALC-MCNC: 2.1 G/DL (ref 1.9–3.5)
GLUCOSE SERPL-MCNC: 124 MG/DL (ref 65–99)
HCT VFR BLD AUTO: 43.5 % (ref 42–52)
HGB BLD-MCNC: 14.6 G/DL (ref 14–18)
IMM GRANULOCYTES # BLD AUTO: 0.02 K/UL (ref 0–0.11)
IMM GRANULOCYTES NFR BLD AUTO: 0.3 % (ref 0–0.9)
LYMPHOCYTES # BLD AUTO: 2.85 K/UL (ref 1–4.8)
LYMPHOCYTES NFR BLD: 36 % (ref 22–41)
MAGNESIUM SERPL-MCNC: 2.2 MG/DL (ref 1.5–2.5)
MCH RBC QN AUTO: 31.7 PG (ref 27–33)
MCHC RBC AUTO-ENTMCNC: 33.6 G/DL (ref 32.3–36.5)
MCV RBC AUTO: 94.4 FL (ref 81.4–97.8)
MONOCYTES # BLD AUTO: 0.85 K/UL (ref 0–0.85)
MONOCYTES NFR BLD AUTO: 10.7 % (ref 0–13.4)
NEUTROPHILS # BLD AUTO: 4.02 K/UL (ref 1.82–7.42)
NEUTROPHILS NFR BLD: 50.7 % (ref 44–72)
NRBC # BLD AUTO: 0 K/UL
NRBC BLD-RTO: 0 /100 WBC (ref 0–0.2)
PHOSPHATE SERPL-MCNC: 3.3 MG/DL (ref 2.5–4.5)
PLATELET # BLD AUTO: 261 K/UL (ref 164–446)
PMV BLD AUTO: 9.8 FL (ref 9–12.9)
POTASSIUM SERPL-SCNC: 3.3 MMOL/L (ref 3.6–5.5)
PROT SERPL-MCNC: 5.8 G/DL (ref 6–8.2)
RBC # BLD AUTO: 4.61 M/UL (ref 4.7–6.1)
SODIUM SERPL-SCNC: 139 MMOL/L (ref 135–145)
TROPONIN T SERPL-MCNC: 8 NG/L (ref 6–19)
WBC # BLD AUTO: 7.9 K/UL (ref 4.8–10.8)

## 2025-04-11 PROCEDURE — 85025 COMPLETE CBC W/AUTO DIFF WBC: CPT

## 2025-04-11 PROCEDURE — 83735 ASSAY OF MAGNESIUM: CPT

## 2025-04-11 PROCEDURE — 71275 CT ANGIOGRAPHY CHEST: CPT

## 2025-04-11 PROCEDURE — 71045 X-RAY EXAM CHEST 1 VIEW: CPT

## 2025-04-11 PROCEDURE — 80053 COMPREHEN METABOLIC PANEL: CPT

## 2025-04-11 PROCEDURE — 700117 HCHG RX CONTRAST REV CODE 255: Performed by: EMERGENCY MEDICINE

## 2025-04-11 PROCEDURE — 84100 ASSAY OF PHOSPHORUS: CPT

## 2025-04-11 PROCEDURE — 36415 COLL VENOUS BLD VENIPUNCTURE: CPT

## 2025-04-11 PROCEDURE — 84484 ASSAY OF TROPONIN QUANT: CPT

## 2025-04-11 RX ADMIN — IOHEXOL 50 ML: 350 INJECTION, SOLUTION INTRAVENOUS at 04:20

## 2025-04-11 ASSESSMENT — HEART SCORE
AGE: <45
ECG: NON-SPECIFIC REPOLARIZATION DISTURBANCE
RISK FACTORS: 1-2 RISK FACTORS
HISTORY: SLIGHTLY SUSPICIOUS
HEART SCORE: 2
TROPONIN: LESS THAN OR EQUAL TO NORMAL LIMIT

## 2025-04-11 NOTE — ED NOTES
This RN to lobby for pt. Pt name called x2. Pt not in lobby, bathroom, EKG room, family room, or outside. Will check again in 10 minutes.

## 2025-04-11 NOTE — ED PROVIDER NOTES
"ED Provider Note    CHIEF COMPLAINT  Chief Complaint   Patient presents with    Chest Pain     Pt seen earlier today for similar symptoms.  Pt reports pain to right chest, dizziness, and weakness.  Pt appears very fatigued.      Weakness    Dizziness       EXTERNAL RECORDS REVIEWED  Echocardiogram done in 2012 shows LVEF 60%.  Enlarged right ventricle.    HPI/ROS  LIMITATION TO HISTORY   Select: : None  OUTSIDE HISTORIAN(S):  None     Ady Bowser is a 44 y.o. male who presents to the ER for continuing generalized weakness and left-sided chest pressure.  Denies any new symptoms.  Has not tried any interventions at home.  Denies fevers or chills, no cough, no leg swelling.    PAST MEDICAL HISTORY   has a past medical history of Asthma, Bipolar 1 disorder (HCC), H/O suicide attempt, and Renal failure.    SURGICAL HISTORY   has a past surgical history that includes appendectomy and hernia repair.    FAMILY HISTORY  No family history on file.    SOCIAL HISTORY  Social History     Tobacco Use    Smoking status: Every Day     Current packs/day: 0.50     Types: Cigarettes    Smokeless tobacco: Current   Vaping Use    Vaping status: Not on file   Substance and Sexual Activity    Alcohol use: Not Currently     Comment: socially    Drug use: Not Currently     Comment: denies    Sexual activity: Not on file       CURRENT MEDICATIONS  Home Medications       Reviewed by Natali Alves R.N. (Registered Nurse) on 04/10/25 at 2228  Med List Status: Partial     Medication Last Dose Status   albuterol (VENTOLIN OR PROVENTIL) 108 (90 BASE) MCG/ACT Aero Soln  Active                    ALLERGIES  Allergies   Allergen Reactions    Bee     Toradol [Ketorolac Tromethamine]      Headaches, redness in arm of injection       PHYSICAL EXAM  VITAL SIGNS: /59   Pulse 65   Temp 36.2 °C (97.2 °F) (Temporal)   Resp 16   Ht 1.803 m (5' 11\")   Wt 79.1 kg (174 lb 6.1 oz)   SpO2 93%   BMI 24.32 kg/m²    General: Laying calmly in " stretcher, no distress  CV: Regular rate rhythm no murmurs  Pulmonary: CTAB, normal work of breathing  Abdomen: Soft nondistended nontender  HEENT: NCAT, normal conjunctivae, moist mucous membranes  MSK: No swelling of the lower extremities    EKG/LABS  CBC shows normal cell counts, no leukocytosis or anemia.  CMP with mild hypokalemia 3.3, hyperglycemia 124.  Other electrolytes, renal function and hepatobiliary labs are normal.  Troponin normal at 8.    EKG at 2:01 AM shows NSR slight right axis, T wave inversion in lead III, no STEMI  I have independently interpreted this EKG    RADIOLOGY/PROCEDURES   I have independently interpreted the diagnostic imaging associated with this visit and am waiting the final reading from the radiologist.   My preliminary interpretation is as follows: CTA shows no evidence of PE.  Possible bibasilar atelectasis.    Radiologist interpretation:  CT-CTA CHEST PULMONARY ARTERY W/ RECONS   Final Result         1.  No pulmonary embolus appreciated.   2.  Bibasilar atelectasis and/or infiltrates      DX-CHEST-PORTABLE (1 VIEW)   Final Result         1.  Right basilar atelectasis, no focal infiltrate.          COURSE & MEDICAL DECISION MAKING    ASSESSMENT, COURSE AND PLAN  Care Narrative: Differential includes viral syndrome, ACS, CHF, PE, pneumothorax, muscle spasm, dehydration, electrolyte abnormality    On arrival the patient is resting comfortably.  Vital signs overall reassuring.  He is on room air and has clear breath sounds.  No signs of fluid overload or heart failure on exam.  EKG showed isolated T wave inversion in lead III, no other ischemic changes no arrhythmias.  Chest x-ray showed overall clear lung fields no pneumothorax, no consolidations to suggest pneumonia, no pulmonary edema.  Labs were obtained, normal troponin, ACS unlikely heart score 2.  Given ongoing symptoms we decided to obtain a CT PE.  Fortunately this did not show any evidence of PE.  Bibasilar atelectasis  noted.  At this time the exact etiology of his symptoms is not clear.  I do not feel he has acute pneumonia there is no fevers or leukocytosis.  No cough.  Patient felt comfortable going home to continue monitoring symptoms.  Return precautions provided he was discharged home in stable condition.    CHEST PAIN:   HEART Score for Major Cardiac Events  HEART Score     History: Slightly suspicious  ECG: Non-specific repolarization disturbance  Age: <45  Risk Factors: 1-2 risk factors  Troponin: Less than or equal to normal limit    Heart Score: 2    Total Score   0-3 Points = Low Score, risk of MACE 0.9-1.7%.  4-6 Points = Moderate Score, risk of MACE 12-16.6%  7-10 Points = High Score, risk of MACE 50-65%        DISPOSITION AND DISCUSSIONS    Escalation of care considered, and ultimately not performed: N/A    Barriers to care at this time, including but not limited to: None.     Decision tools and prescription drugs considered including, but not limited to: Recommended ibuprofen as needed.    FINAL DIAGNOSIS  1. Chest pain, unspecified type    2. Weakness         Electronically signed by: Dameon Hurley M.D., 4/11/2025 2:02 AM

## 2025-04-11 NOTE — DISCHARGE INSTRUCTIONS
Your labs and CT scan looked good. You may be ill with a virus. Keep an eye on your symptoms. If they're getting worse you may come back to the ER.

## 2025-04-11 NOTE — ED TRIAGE NOTES
"Chief Complaint   Patient presents with    Chest Pain     Pt seen earlier today for similar symptoms.  Pt reports pain to right chest, dizziness, and weakness.  Pt appears very fatigued.      Weakness    Dizziness       Patient to triage ambulatory with a slow but steady gait, AAOx4, Appropriate precautions in place.   Pt refused additional blood work orders for CP protocol.      Explained wait time and triage process. Placed back in ED lobby. Told to notify ED tech or RN of any changes, verbalized understanding.    /80   Pulse 60   Temp 36.2 °C (97.2 °F) (Temporal)   Resp 16   Ht 1.803 m (5' 11\")   Wt 79.1 kg (174 lb 6.1 oz)   SpO2 100%   BMI 24.32 kg/m²     "